# Patient Record
Sex: FEMALE
[De-identification: names, ages, dates, MRNs, and addresses within clinical notes are randomized per-mention and may not be internally consistent; named-entity substitution may affect disease eponyms.]

---

## 2019-05-29 ENCOUNTER — APPOINTMENT (OUTPATIENT)
Dept: ORTHOPEDIC SURGERY | Facility: CLINIC | Age: 54
End: 2019-05-29
Payer: COMMERCIAL

## 2019-05-29 VITALS — BODY MASS INDEX: 31.92 KG/M2 | WEIGHT: 187 LBS | HEIGHT: 64 IN

## 2019-05-29 PROCEDURE — 99204 OFFICE O/P NEW MOD 45 MIN: CPT

## 2019-05-29 PROCEDURE — 73521 X-RAY EXAM HIPS BI 2 VIEWS: CPT

## 2019-05-29 NOTE — PHYSICAL EXAM
[de-identified] : Patient's full passive internal and external rotation of the left hip she is neurovascularly intact distally. She does have significant tenderness to palpation over the greater trochanter.\par \par Patient also some mild to moderate lumbar paraspinal tenderness to palpation today. [de-identified] : AP and lateral of both hips were obtained today show no evidence of any bony abnormalities no evidence of arthritis.

## 2019-05-29 NOTE — DISCUSSION/SUMMARY
[de-identified] : Patient will be placed on Naprosyn combined with Pepcid twice a day for 6 days she will follow up for cortisone injection to the left and if her main symptom or birth to her lower back an MRI of her lumbar spine to be warranted.
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
## 2020-02-03 ENCOUNTER — APPOINTMENT (OUTPATIENT)
Dept: ORTHOPEDIC SURGERY | Facility: CLINIC | Age: 55
End: 2020-02-03
Payer: COMMERCIAL

## 2020-02-03 PROCEDURE — 99214 OFFICE O/P EST MOD 30 MIN: CPT

## 2020-02-03 PROCEDURE — 73562 X-RAY EXAM OF KNEE 3: CPT | Mod: RT

## 2020-02-03 RX ORDER — HYALURONATE SODIUM 30 MG/2 ML
30 SYRINGE (ML) INTRAARTICULAR
Qty: 3 | Refills: 0 | Status: ACTIVE | OUTPATIENT
Start: 2020-02-03

## 2020-02-03 NOTE — PHYSICAL EXAM
[de-identified] : Right knee range of motion 0-125°. She is neurovascular intact distally. There is mild warmth about the medial joint line soft tissue swelling but no effusion negative Jesus test. Quad tone is good she is neurovascularly intact distally. [de-identified] : AP standing individual lateral and sunrise views are obtained showing moderate diminishment of medial joint space on standing AP projection of the right knee.

## 2020-02-03 NOTE — REASON FOR VISIT
[Follow-Up Visit] : a follow-up visit for [FreeTextEntry2] : RIGHT KNEE PAIN-STIFFNESS-SENT FOR NEW XRAYS -HAD GEL INJ 2016 WITH DR. OSBORNE

## 2020-02-03 NOTE — HISTORY OF PRESENT ILLNESS
[de-identified] : The patient had Orthovisc injections 3 years ago she is now having resumption of medial sided right knee pain. Intermittent never severe.

## 2020-02-03 NOTE — DISCUSSION/SUMMARY
[de-identified] : We discussed her options patient will continue to take over-the-counter Motrin which seems to be sufficient and help with her discomfort. We will order Orthovisc injections for the right knee and contact her once the medication is available for use.

## 2022-02-11 ENCOUNTER — APPOINTMENT (OUTPATIENT)
Dept: ORTHOPEDIC SURGERY | Facility: CLINIC | Age: 57
End: 2022-02-11
Payer: COMMERCIAL

## 2022-02-11 PROCEDURE — 73562 X-RAY EXAM OF KNEE 3: CPT | Mod: LT

## 2022-02-11 PROCEDURE — 99213 OFFICE O/P EST LOW 20 MIN: CPT

## 2022-02-11 RX ORDER — HYALURONATE SODIUM, STABILIZED 88 MG/4 ML
88 SYRINGE (ML) INTRAARTICULAR
Qty: 2 | Refills: 0 | Status: ACTIVE | OUTPATIENT
Start: 2022-02-11

## 2022-02-11 NOTE — HISTORY OF PRESENT ILLNESS
[de-identified] : Patient returns today she has resumption of right medial knee pain. She did well and passed with each 8 injections. Patient started and limits her from getting out of a seated position. She should be evaluated for the first time in 2 years.

## 2022-02-11 NOTE — REASON FOR VISIT
[Follow-Up Visit] : a follow-up visit for [FreeTextEntry2] : right knee pain - sent for new xrays and reorder gel

## 2022-02-11 NOTE — DISCUSSION/SUMMARY
[de-identified] : Patient defers a cortisone injection today. We talked about options we will order a modest injection as a fire set available for use in the right knee.

## 2022-02-11 NOTE — PHYSICAL EXAM
[de-identified] : Right knee range of motion 0-125°. She is neurovascular intact distally. There is mild warmth about the medial joint line soft tissue swelling but no effusion negative Jesus test. Quad tone is good she is neurovascularly intact distally. [de-identified] : Radiographs were ordered today to assess for potential progression of right knee medial osteoarthritis. AP standing and lateral sunrise views were obtained showing approximately 90% cartilage loss in the medial compartment standing AP projection of the right knee. There is also some interval progression since the radiographs 2 years earlier.

## 2022-09-08 ENCOUNTER — APPOINTMENT (OUTPATIENT)
Dept: ORTHOPEDIC SURGERY | Facility: CLINIC | Age: 57
End: 2022-09-08

## 2022-09-08 VITALS — HEIGHT: 64 IN | WEIGHT: 196 LBS | BODY MASS INDEX: 33.46 KG/M2

## 2022-09-08 PROCEDURE — 99213 OFFICE O/P EST LOW 20 MIN: CPT
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
## 2022-09-08 NOTE — PHYSICAL EXAM
[de-identified] : Right knee range of motion 0-125°. She is neurovascular intact distally. There is mild warmth about the medial joint line soft tissue swelling but no effusion negative Jesus test. Quad tone is good she is neurovascularly intact distally.

## 2022-09-08 NOTE — REASON FOR VISIT
[Follow-Up Visit] : a follow-up visit for [Knee Pain] : knee pain [FreeTextEntry2] : Rt knee pain. Pt was previously denied the inj. Pt is trying to get the gel injections approved.

## 2022-09-08 NOTE — DISCUSSION/SUMMARY
[de-identified] : We talked at length about the underlying etiology of the patient's right medial knee pain.  Patient has advanced osteoarthritis with 90% cartilage loss mild varus inclination.  Patient defers on a cortisone injection today.  Patient will consider HA injections we will try to order them and have them authorized by her insurance company.  We will notify the patient once they are available for use.

## 2022-10-11 RX ORDER — HYALURONATE SODIUM 10 MG/ML
25 SYRINGE (ML) INTRAARTICULAR
Qty: 3 | Refills: 0 | Status: ACTIVE | OUTPATIENT
Start: 2022-10-11

## 2022-10-13 ENCOUNTER — APPOINTMENT (OUTPATIENT)
Dept: ORTHOPEDIC SURGERY | Facility: CLINIC | Age: 57
End: 2022-10-13

## 2022-10-13 PROCEDURE — 20611 DRAIN/INJ JOINT/BURSA W/US: CPT | Mod: RT

## 2022-10-13 NOTE — PHYSICAL EXAM
[de-identified] : Right knee range of motion 0-125°. She is neurovascular intact distally. There is mild warmth about the medial joint line soft tissue swelling but no effusion negative Jesus test. Quad tone is good she is neurovascularly intact distally.

## 2022-10-13 NOTE — PROCEDURE
[de-identified] : VISCO-3\par DULCE MARIA\par NDC/CAT NO.  -455-03\par LOT # [id number]\par EXP 2024-05-31\par 2.5ML\par \par Patient given the first of a series of 3 right knee HA injections today.  This was done under sterile conditions and ultrasound guidance.  Patient tolerated the procedure well. [FreeTextEntry1] : \par

## 2022-10-13 NOTE — DISCUSSION/SUMMARY
[de-identified] : Patient will ice the knee here today tonight if symptomatic.  She will follow-up next week for the second injection.

## 2022-10-13 NOTE — REASON FOR VISIT
[Follow-Up Visit] : a follow-up visit for [Knee Pain] : knee pain [FreeTextEntry2] : Rt knee visco-3 gel inj 1/3

## 2022-10-18 ENCOUNTER — APPOINTMENT (OUTPATIENT)
Dept: ORTHOPEDIC SURGERY | Facility: CLINIC | Age: 57
End: 2022-10-18

## 2022-10-18 PROCEDURE — 20611 DRAIN/INJ JOINT/BURSA W/US: CPT | Mod: RT

## 2022-10-18 NOTE — DISCUSSION/SUMMARY
[de-identified] : Patient will ice the knee here today ice again tonight if symptomatic.  Patient will monitor symptoms follow-up with us next week for the final injection.

## 2022-10-18 NOTE — HISTORY OF PRESENT ILLNESS
[de-identified] : Patient returns today for the second of 3 right knee HA injections.  She already felt moderate improvement after the first injection.

## 2022-10-18 NOTE — REASON FOR VISIT
[Follow-Up Visit] : a follow-up visit for [Knee Pain] : knee pain [FreeTextEntry2] : Rt knee visco 3 gel inj 2/3

## 2022-10-18 NOTE — PROCEDURE
[de-identified] : VISCO-3\par DULCE MARIA\par NDC/CAT NO.  -299-03\par LOT #[id number]\par EXP 2024-05-31\par 2.5ML00\par \par Patient was given a second of 3 right knee HA injections today.  This is done in sterile conditions and ultrasound guidance the lateral joint line.  Patient tolerated the injection well [FreeTextEntry1] : \par

## 2022-10-18 NOTE — PHYSICAL EXAM
[de-identified] : Right knee range of motion 0-125°. She is neurovascular intact distally. There is mild warmth about the medial joint line soft tissue swelling but no effusion negative Jesus test. Quad tone is good she is neurovascularly intact distally.

## 2022-10-25 ENCOUNTER — APPOINTMENT (OUTPATIENT)
Dept: ORTHOPEDIC SURGERY | Facility: CLINIC | Age: 57
End: 2022-10-25

## 2022-10-25 PROCEDURE — 20611 DRAIN/INJ JOINT/BURSA W/US: CPT

## 2022-10-26 NOTE — HISTORY OF PRESENT ILLNESS
[de-identified] : Patient returns today for the 3 of 3 right knee HA injections. She already felt moderate improvement after the first injection. \par rightknee injected with 3/3 VISCO3

## 2022-10-26 NOTE — PHYSICAL EXAM
[de-identified] : Right knee range of motion 0-125°. She is neurovascular intact distally. There is mild warmth about the medial joint line soft tissue swelling but no effusion negative Jesus test. Quad tone is good she is neurovascularly intact distally. \par

## 2022-10-26 NOTE — PROCEDURE
[de-identified] : VISCO-3\par DULCE MARIA\par NDC/CAT NO.  -581-03\par LOT # [id number]\par EXP 2024-05-31\par 2.5ML\par \par Injection 3/3\par Patient was given an VISCO 3 in the lateral compartment of the right  knee. Injection is performed under sterile conditions with ultrasound guidance. Patient tolerated procedure well. \par   [FreeTextEntry1] : \par

## 2022-10-27 ENCOUNTER — APPOINTMENT (OUTPATIENT)
Dept: ORTHOPEDIC SURGERY | Facility: CLINIC | Age: 57
End: 2022-10-27

## 2024-05-17 ENCOUNTER — APPOINTMENT (OUTPATIENT)
Dept: ORTHOPEDIC SURGERY | Facility: CLINIC | Age: 59
End: 2024-05-17
Payer: COMMERCIAL

## 2024-05-17 DIAGNOSIS — M17.11 UNILATERAL PRIMARY OSTEOARTHRITIS, RIGHT KNEE: ICD-10-CM

## 2024-05-17 PROCEDURE — 99215 OFFICE O/P EST HI 40 MIN: CPT

## 2024-05-17 PROCEDURE — 72100 X-RAY EXAM L-S SPINE 2/3 VWS: CPT

## 2024-05-17 PROCEDURE — 73562 X-RAY EXAM OF KNEE 3: CPT | Mod: 50

## 2024-05-17 RX ORDER — MELOXICAM 15 MG/1
15 TABLET ORAL
Qty: 30 | Refills: 2 | Status: ACTIVE | COMMUNITY
Start: 2024-05-17 | End: 1900-01-01

## 2024-05-17 NOTE — HISTORY OF PRESENT ILLNESS
[de-identified] : Patient returns today with continued right knee pain.  She did not feel any significant sustained improvement with the previous gel injections.  She is here to discuss further treatment options.  Patient has difficulty with stair climbing pain on a daily basis.

## 2024-05-17 NOTE — DISCUSSION/SUMMARY
[de-identified] : Patient I talked at length about her options of believe she would be best served with consideration for knee replacement surgery.  Reasonable risk and benefits of the procedure including potential complications were discussed in detail.  We also talked about our implant selection criteria and as well as typical convalescent expectations expectations for implant longevity.  Surgical risks reviewed. The reasonable risks and benefits of knee replacement surgery discussed in detail with the patient. Patient asked appropriate questions which were answered to their satisfaction. We talked about potential complications including complications they may require revision surgery such as component loosening failure migration wear and also potential complications of infection and stiffness.   Patient will consider this option we will try to accommodate her pending medical clearance.  As a temporizing measure for discomfort patient defers on cortisone injection we will place her on Celebrex 002 mg p.o. twice daily ID for a 6-day course then to be taken thereafter as needed for pain.  Patient will consider her option for knee replacement surgery and contact us when she is ready to move forward.  Today's decision to proceed with elective knee replacement surgery lasted 45 minutes.

## 2024-05-17 NOTE — PHYSICAL EXAM
[de-identified] : Right knee range of motion 0-125. She is neurovascular intact distally. There is mild warmth about the medial joint line soft tissue swelling but no effusion negative Jesus test. Quad tone is good she is neurovascularly intact distally. [de-identified] : Knee radiographs were ordered today AP standing individual lateral sunrise views were obtained showing severe narrowing of medial compartment right knee.  We have radiographs in 2016 in comparison which showed fairly well-preserved joint spaces i.e. patient had fairly progressive medial compartment osteoarthritis over the last few years.  Early secondary findings of osteophyte and cyst formation also noted moderate varus inclination also noted.

## 2024-05-17 NOTE — REASON FOR VISIT
[Follow-Up Visit] : a follow-up visit for [Knee Pain] : knee pain [Other: ____] : [unfilled] [FreeTextEntry2] : right knee pain. recieved visco 3 gels in october 2023., she states she is also having pain in the lumbar which is now travel down the right leg.

## 2024-08-14 ENCOUNTER — RX RENEWAL (OUTPATIENT)
Age: 59
End: 2024-08-14

## 2025-06-24 ENCOUNTER — APPOINTMENT (OUTPATIENT)
Dept: ORTHOPEDIC SURGERY | Facility: CLINIC | Age: 60
End: 2025-06-24
Payer: COMMERCIAL

## 2025-06-24 PROBLEM — Z86.79 HISTORY OF ATRIAL FIBRILLATION: Status: RESOLVED | Noted: 2025-06-24 | Resolved: 2025-06-24

## 2025-06-24 PROBLEM — M25.511 ACUTE PAIN OF RIGHT SHOULDER: Status: ACTIVE | Noted: 2025-06-24

## 2025-06-24 PROBLEM — Z78.9 SOCIAL ALCOHOL USE: Status: ACTIVE | Noted: 2025-06-24

## 2025-06-24 PROBLEM — Z78.9 NON-SMOKER: Status: ACTIVE | Noted: 2025-06-24

## 2025-06-24 PROBLEM — Z86.79 HISTORY OF HYPERTENSION: Status: RESOLVED | Noted: 2025-06-24 | Resolved: 2025-06-24

## 2025-06-24 PROBLEM — Z78.9 MODERATE EXERCISE: Status: ACTIVE | Noted: 2025-06-24

## 2025-06-24 PROCEDURE — 99214 OFFICE O/P EST MOD 30 MIN: CPT

## 2025-06-24 RX ORDER — LOSARTAN POTASSIUM 100 MG/1
TABLET, FILM COATED ORAL
Refills: 0 | Status: ACTIVE | COMMUNITY

## 2025-07-23 PROBLEM — M19.011 ARTHRITIS OF RIGHT ACROMIOCLAVICULAR JOINT: Status: ACTIVE | Noted: 2025-06-24

## 2025-07-23 PROBLEM — M75.121 NONTRAUMATIC COMPLETE TEAR OF RIGHT ROTATOR CUFF: Status: ACTIVE | Noted: 2025-07-23

## 2025-07-23 PROBLEM — M67.911 TENDINOPATHY OF RIGHT ROTATOR CUFF: Status: ACTIVE | Noted: 2025-06-24

## 2025-07-24 ENCOUNTER — APPOINTMENT (OUTPATIENT)
Dept: ORTHOPEDIC SURGERY | Facility: CLINIC | Age: 60
End: 2025-07-24
Payer: COMMERCIAL

## 2025-07-24 DIAGNOSIS — M67.911 UNSPECIFIED DISORDER OF SYNOVIUM AND TENDON, RIGHT SHOULDER: ICD-10-CM

## 2025-07-24 DIAGNOSIS — M75.121 COMPLETE ROTATOR CUFF TEAR OR RUPTURE OF RIGHT SHOULDER, NOT SPECIFIED AS TRAUMATIC: ICD-10-CM

## 2025-07-24 DIAGNOSIS — M19.011 PRIMARY OSTEOARTHRITIS, RIGHT SHOULDER: ICD-10-CM

## 2025-07-24 PROCEDURE — 99214 OFFICE O/P EST MOD 30 MIN: CPT

## 2025-09-04 ENCOUNTER — APPOINTMENT (OUTPATIENT)
Dept: ORTHOPEDIC SURGERY | Facility: CLINIC | Age: 60
End: 2025-09-04
Payer: COMMERCIAL

## 2025-09-04 DIAGNOSIS — M67.911 UNSPECIFIED DISORDER OF SYNOVIUM AND TENDON, RIGHT SHOULDER: ICD-10-CM

## 2025-09-04 DIAGNOSIS — M75.121 COMPLETE ROTATOR CUFF TEAR OR RUPTURE OF RIGHT SHOULDER, NOT SPECIFIED AS TRAUMATIC: ICD-10-CM

## 2025-09-04 PROCEDURE — 99213 OFFICE O/P EST LOW 20 MIN: CPT
